# Patient Record
Sex: FEMALE | Race: WHITE | NOT HISPANIC OR LATINO
[De-identification: names, ages, dates, MRNs, and addresses within clinical notes are randomized per-mention and may not be internally consistent; named-entity substitution may affect disease eponyms.]

---

## 2020-11-10 ENCOUNTER — RESULT REVIEW (OUTPATIENT)
Age: 70
End: 2020-11-10

## 2022-02-04 ENCOUNTER — APPOINTMENT (OUTPATIENT)
Dept: OTOLARYNGOLOGY | Facility: CLINIC | Age: 72
End: 2022-02-04
Payer: COMMERCIAL

## 2022-02-04 ENCOUNTER — NON-APPOINTMENT (OUTPATIENT)
Age: 72
End: 2022-02-04

## 2022-02-04 VITALS
BODY MASS INDEX: 28.68 KG/M2 | DIASTOLIC BLOOD PRESSURE: 83 MMHG | SYSTOLIC BLOOD PRESSURE: 151 MMHG | WEIGHT: 168 LBS | HEART RATE: 83 BPM | HEIGHT: 64 IN | OXYGEN SATURATION: 98 % | TEMPERATURE: 97.2 F

## 2022-02-04 PROBLEM — Z00.00 ENCOUNTER FOR PREVENTIVE HEALTH EXAMINATION: Status: ACTIVE | Noted: 2022-02-04

## 2022-02-04 PROCEDURE — 99203 OFFICE O/P NEW LOW 30 MIN: CPT | Mod: 25

## 2022-02-04 PROCEDURE — 69210 REMOVE IMPACTED EAR WAX UNI: CPT

## 2022-02-04 NOTE — CONSULT LETTER
[Dear  ___] : Dear  [unfilled], [Consult Letter:] : I had the pleasure of evaluating your patient, [unfilled]. [Please see my note below.] : Please see my note below. [Consult Closing:] : Thank you very much for allowing me to participate in the care of this patient.  If you have any questions, please do not hesitate to contact me. [Sincerely,] : Sincerely, [FreeTextEntry3] : Mihir Deras MD\par Director; [facility]\par Otolaryngology - Head and Neck Surgery\par [facility]\par \par \par Department of Otolaryngology\par [facility]\par \par [address]\par [facility], 10th Floor\[place], [place], [address]\par Office Tel: [phone]\par \par \par

## 2022-02-04 NOTE — HISTORY OF PRESENT ILLNESS
[de-identified] : 72 yo female who present for cerumen removal.  Seen by PCP who noted bilateral cerumen impaction.  Pt doesn't believe she has hearing loss.  Denies tinnitus, vertigo, otorrhea or otalgia.  No qtip use.  No loud noise exposure.  No ENT issues otherwise.

## 2022-02-04 NOTE — PHYSICAL EXAM
[Midline] : trachea located in midline position [Normal] : no rashes [de-identified] : bilateral cerumen impaction, normal once removed

## 2022-02-04 NOTE — ASSESSMENT
[FreeTextEntry1] : 70 yo female who presents for ear cleaning.  On exam there was evidence of cerumen impaction.  This was removed without issue.  Pt tolerated this well and noted improvement in hearing.  Debrox prn and follow up prn.\par \par - debrox prn\par - fu prn

## 2022-02-04 NOTE — PROCEDURE
[FreeTextEntry3] : -\par Cerumen Removal/Ear Cleaning for Otitis Externa\par Pre-operative Diagnosis: bilateral Cerumen Impaction\par Post-operative Diagnosis: Same\par Procedure:  Binocular microscopy with cerumen removal- 52153\par Procedure Details:  \par The patient was placed in the supine position.  The operating microscope was positioned.  I then placed the ear speculum in the EAC.  Cerumen was then removed using a mixture of otologic curettes, and suction.  The TM was noted to be intact. I then performed the procedure of the opposite ear in similar fashion.  The patient tolerated procedure well.\par \par Findings: \par Bilateral Ear Canal - normal\par Bilateral Tympanic Membrane - normal\par \par Recommendations: Debrox\par Complications: None\par \par

## 2022-02-16 ENCOUNTER — RESULT REVIEW (OUTPATIENT)
Age: 72
End: 2022-02-16

## 2024-03-08 ENCOUNTER — APPOINTMENT (OUTPATIENT)
Dept: OTOLARYNGOLOGY | Facility: CLINIC | Age: 74
End: 2024-03-08

## 2024-03-14 ENCOUNTER — APPOINTMENT (OUTPATIENT)
Dept: OTOLARYNGOLOGY | Facility: CLINIC | Age: 74
End: 2024-03-14
Payer: COMMERCIAL

## 2024-03-14 VITALS
DIASTOLIC BLOOD PRESSURE: 86 MMHG | SYSTOLIC BLOOD PRESSURE: 133 MMHG | HEIGHT: 64 IN | OXYGEN SATURATION: 97 % | HEART RATE: 76 BPM | TEMPERATURE: 96.7 F | WEIGHT: 175 LBS | BODY MASS INDEX: 29.88 KG/M2

## 2024-03-14 DIAGNOSIS — H61.23 IMPACTED CERUMEN, BILATERAL: ICD-10-CM

## 2024-03-14 PROCEDURE — 99212 OFFICE O/P EST SF 10 MIN: CPT | Mod: 25

## 2024-03-14 NOTE — CONSULT LETTER
[Dear  ___] : Dear  [unfilled], [Consult Letter:] : I had the pleasure of evaluating your patient, [unfilled]. [Please see my note below.] : Please see my note below. [Sincerely,] : Sincerely, [Consult Closing:] : Thank you very much for allowing me to participate in the care of this patient.  If you have any questions, please do not hesitate to contact me. [FreeTextEntry3] : Mihir Deras MD\par  Director; [facility]\par  Otolaryngology - Head and Neck Surgery\par  [facility]\par  \par  \par  Department of Otolaryngology\par  [facility]\par  \par  [address]\par  [facility], 10th Floor\[place]  [place], [address]\par  Office Tel: [phone]\par  \par  \par

## 2024-03-14 NOTE — HISTORY OF PRESENT ILLNESS
[de-identified] : - 72 yo female who present for cerumen removal.  Seen by PCP who noted bilateral cerumen impaction.  Pt doesn't believe she has hearing loss.  Denies tinnitus, vertigo, otorrhea or otalgia.  No qtip use.  No loud noise exposure.  No ENT issues otherwise. - [FreeTextEntry1] : 3/14/24: Patient presents for repeat evaluation.  Feels that ears are clogged.  No new ear, nose, throat symptoms otherwise.

## 2024-03-14 NOTE — PHYSICAL EXAM
[Midline] : trachea located in midline position [Normal] : no rashes [de-identified] : bilateral cerumen impaction, normal once removed

## 2024-03-14 NOTE — PROCEDURE
[FreeTextEntry3] : -\par  Cerumen Removal/Ear Cleaning for Otitis Externa\par  Pre-operative Diagnosis: bilateral Cerumen Impaction\par  Post-operative Diagnosis: Same\par  Procedure:  Binocular microscopy with cerumen removal- 31056\par  Procedure Details:  \par  The patient was placed in the supine position.  The operating microscope was positioned.  I then placed the ear speculum in the EAC.  Cerumen was then removed using a mixture of otologic curettes, and suction.  The TM was noted to be intact. I then performed the procedure of the opposite ear in similar fashion.  The patient tolerated procedure well.\par  \par  Findings: \par  Bilateral Ear Canal - normal\par  Bilateral Tympanic Membrane - normal\par  \par  Recommendations: Debrox\par  Complications: None\par  \par

## 2024-03-14 NOTE — ASSESSMENT
[FreeTextEntry1] : - 70 yo female who presents for ear cleaning.  On exam there was evidence of cerumen impaction.  This was removed without issue.  Pt tolerated this well and noted improvement in hearing.  Debrox prn and follow up prn.  3/14/24:  Ears cleaned today without issue.  Patient noted immediate improvement back to baseline.  Follow-up in 6 months for repeat evaluation.  Debrox as needed.  - debrox prn - fu 6 months for repeat evaluation

## 2024-09-13 ENCOUNTER — APPOINTMENT (OUTPATIENT)
Dept: OTOLARYNGOLOGY | Facility: CLINIC | Age: 74
End: 2024-09-13

## 2024-09-16 NOTE — PHYSICAL EXAM
[Midline] : trachea located in midline position [Normal] : no rashes [de-identified] : bilateral cerumen impaction, normal once removed

## 2024-09-16 NOTE — HISTORY OF PRESENT ILLNESS
[de-identified] : - 72 yo female who present for cerumen removal.  Seen by PCP who noted bilateral cerumen impaction.  Pt doesn't believe she has hearing loss.  Denies tinnitus, vertigo, otorrhea or otalgia.  No qtip use.  No loud noise exposure.  No ENT issues otherwise. - 3/14/24: Patient presents for repeat evaluation.  Feels that ears are clogged.  No new ear, nose, throat symptoms otherwise. - [FreeTextEntry1] : 9/13/24: Patient presents for repeat evaluation.

## 2024-09-16 NOTE — CONSULT LETTER
[Dear  ___] : Dear  [unfilled], [Consult Letter:] : I had the pleasure of evaluating your patient, [unfilled]. [Please see my note below.] : Please see my note below. [Consult Closing:] : Thank you very much for allowing me to participate in the care of this patient.  If you have any questions, please do not hesitate to contact me. [Sincerely,] : Sincerely, [FreeTextEntry3] : Mihir Deras MD\par  Director; [facility]\par  Otolaryngology - Head and Neck Surgery\par  [facility]\par  \par  \par  Department of Otolaryngology\par  [facility]\par  \par  [address]\par  [facility], 10th Floor\[place]  [place], [address]\par  Office Tel: [phone]\par  \par  \par

## 2024-09-16 NOTE — PROCEDURE
[FreeTextEntry3] : -\par  Cerumen Removal/Ear Cleaning for Otitis Externa\par  Pre-operative Diagnosis: bilateral Cerumen Impaction\par  Post-operative Diagnosis: Same\par  Procedure:  Binocular microscopy with cerumen removal- [id number]\par  Procedure Details:  \par  The patient was placed in the supine position.  The operating microscope was positioned.  I then placed the ear speculum in the EAC.  Cerumen was then removed using a mixture of otologic curettes, and suction.  The TM was noted to be intact. I then performed the procedure of the opposite ear in similar fashion.  The patient tolerated procedure well.\par  \par  Findings: \par  Bilateral Ear Canal - normal\par  Bilateral Tympanic Membrane - normal\par  \par  Recommendations: Debrox\par  Complications: None\par  \par

## 2024-09-16 NOTE — ASSESSMENT
[FreeTextEntry1] : - 72 yo female who presents for ear cleaning.  On exam there was evidence of cerumen impaction.  This was removed without issue.  Pt tolerated this well and noted improvement in hearing.  Debrox prn and follow up prn.  3/14/24:  Ears cleaned today without issue.  Patient noted immediate improvement back to baseline.  Follow-up in 6 months for repeat evaluation.  Debrox as needed.  9/13/24:  - debrox prn - fu 6 months for repeat evaluation

## 2025-07-30 ENCOUNTER — NON-APPOINTMENT (OUTPATIENT)
Age: 75
End: 2025-07-30

## 2025-07-30 ENCOUNTER — APPOINTMENT (OUTPATIENT)
Dept: OPHTHALMOLOGY | Facility: CLINIC | Age: 75
End: 2025-07-30